# Patient Record
Sex: FEMALE | Race: WHITE | NOT HISPANIC OR LATINO | Employment: OTHER | URBAN - METROPOLITAN AREA
[De-identification: names, ages, dates, MRNs, and addresses within clinical notes are randomized per-mention and may not be internally consistent; named-entity substitution may affect disease eponyms.]

---

## 2023-11-02 ENCOUNTER — TELEPHONE (OUTPATIENT)
Dept: PRIMARY CARE CLINIC | Facility: CLINIC | Age: 53
End: 2023-11-02
Payer: COMMERCIAL

## 2023-11-02 NOTE — TELEPHONE ENCOUNTER
----- Message from Elizabeth Suarez MD sent at 11/2/2023 10:55 AM CDT -----  Contact: ROSAURA PUCKETT [[id number]]  I am going to need her records first, I will check her meditech      ----- Message -----  From: Irish Bourgeois MA  Sent: 11/2/2023  10:47 AM CDT  To: Elizabeth Suarez MD      ----- Message -----  From: Denise Orourke  Sent: 11/2/2023  10:36 AM CDT  To: [facility] Primary [facility] Clinical Support Staff    ..Type:  Patient Requesting Call    Who Called: ROSAURA PUCKETT [[id number]]  Does the patient know what this is regarding?: Scheduling NP appt, complex issues from covid 2 years ago and currently on oxygen  Would the patient rather a call back or a response via [facility]? call  Best Call Back Number: .[phone] (home)   Additional Information:

## 2024-05-31 DIAGNOSIS — J45.909 ASTHMA, UNSPECIFIED ASTHMA SEVERITY, UNSPECIFIED WHETHER COMPLICATED, UNSPECIFIED WHETHER PERSISTENT: Primary | ICD-10-CM

## 2025-04-17 DIAGNOSIS — G62.9 POLYNEUROPATHY: Primary | ICD-10-CM

## 2025-06-03 ENCOUNTER — OFFICE VISIT (OUTPATIENT)
Dept: NEUROLOGY | Facility: CLINIC | Age: 55
End: 2025-06-03
Payer: MEDICARE

## 2025-06-03 ENCOUNTER — LAB VISIT (OUTPATIENT)
Dept: LAB | Facility: OTHER | Age: 55
End: 2025-06-03
Attending: STUDENT IN AN ORGANIZED HEALTH CARE EDUCATION/TRAINING PROGRAM
Payer: MEDICARE

## 2025-06-03 ENCOUNTER — RESULTS FOLLOW-UP (OUTPATIENT)
Dept: NEUROLOGY | Facility: CLINIC | Age: 55
End: 2025-06-03

## 2025-06-03 VITALS
SYSTOLIC BLOOD PRESSURE: 111 MMHG | WEIGHT: 199.75 LBS | BODY MASS INDEX: 37.71 KG/M2 | HEIGHT: 61 IN | HEART RATE: 101 BPM | DIASTOLIC BLOOD PRESSURE: 77 MMHG

## 2025-06-03 DIAGNOSIS — Z77.010 ARSENIC SUSPECTED EXPOSURE: Primary | ICD-10-CM

## 2025-06-03 DIAGNOSIS — R20.8 VIBRATION SENSORY LOSS: ICD-10-CM

## 2025-06-03 DIAGNOSIS — M79.2 NEUROPATHIC PAIN: ICD-10-CM

## 2025-06-03 DIAGNOSIS — G60.9 HEREDITARY AND IDIOPATHIC NEUROPATHY, UNSPECIFIED: ICD-10-CM

## 2025-06-03 DIAGNOSIS — G62.9 POLYNEUROPATHY: Primary | ICD-10-CM

## 2025-06-03 PROBLEM — K21.9 GERD (GASTROESOPHAGEAL REFLUX DISEASE): Status: ACTIVE | Noted: 2025-06-03

## 2025-06-03 PROBLEM — J44.9 COPD (CHRONIC OBSTRUCTIVE PULMONARY DISEASE): Chronic | Status: ACTIVE | Noted: 2025-06-03

## 2025-06-03 PROBLEM — M06.9 RHEUMATOID ARTHRITIS: Chronic | Status: ACTIVE | Noted: 2025-06-03

## 2025-06-03 PROBLEM — Z99.81 ON HOME OXYGEN THERAPY: Chronic | Status: ACTIVE | Noted: 2025-06-03

## 2025-06-03 LAB
URATE SERPL-MCNC: 3.4 MG/DL (ref 2.4–5.7)
VIT B12 SERPL-MCNC: 1017 PG/ML (ref 210–950)

## 2025-06-03 PROCEDURE — 84550 ASSAY OF BLOOD/URIC ACID: CPT

## 2025-06-03 PROCEDURE — 84165 PROTEIN E-PHORESIS SERUM: CPT | Mod: ,,, | Performed by: PATHOLOGY

## 2025-06-03 PROCEDURE — 82525 ASSAY OF COPPER: CPT

## 2025-06-03 PROCEDURE — 36415 COLL VENOUS BLD VENIPUNCTURE: CPT

## 2025-06-03 PROCEDURE — 84165 PROTEIN E-PHORESIS SERUM: CPT

## 2025-06-03 PROCEDURE — 86334 IMMUNOFIX E-PHORESIS SERUM: CPT

## 2025-06-03 PROCEDURE — 86334 IMMUNOFIX E-PHORESIS SERUM: CPT | Mod: ,,, | Performed by: PATHOLOGY

## 2025-06-03 PROCEDURE — 82607 VITAMIN B-12: CPT

## 2025-06-03 PROCEDURE — 86038 ANTINUCLEAR ANTIBODIES: CPT

## 2025-06-03 PROCEDURE — 84630 ASSAY OF ZINC: CPT

## 2025-06-03 PROCEDURE — 99999 PR PBB SHADOW E&M-EST. PATIENT-LVL III: CPT | Mod: PBBFAC,,, | Performed by: STUDENT IN AN ORGANIZED HEALTH CARE EDUCATION/TRAINING PROGRAM

## 2025-06-03 PROCEDURE — 84207 ASSAY OF VITAMIN B-6: CPT

## 2025-06-03 PROCEDURE — 99213 OFFICE O/P EST LOW 20 MIN: CPT | Mod: PBBFAC | Performed by: STUDENT IN AN ORGANIZED HEALTH CARE EDUCATION/TRAINING PROGRAM

## 2025-06-03 RX ORDER — VORTIOXETINE 20 MG/1
1 TABLET, FILM COATED ORAL DAILY
COMMUNITY
Start: 2025-04-03

## 2025-06-03 RX ORDER — PROCHLORPERAZINE MALEATE 10 MG
10 TABLET ORAL 3 TIMES DAILY
COMMUNITY

## 2025-06-03 RX ORDER — ALBUTEROL SULFATE AND BUDESONIDE 90; 80 UG/1; UG/1
2 AEROSOL, METERED RESPIRATORY (INHALATION) 4 TIMES DAILY PRN
COMMUNITY
Start: 2025-05-23

## 2025-06-03 RX ORDER — CYCLOBENZAPRINE HCL 10 MG
10 TABLET ORAL 2 TIMES DAILY
COMMUNITY

## 2025-06-03 RX ORDER — ALENDRONATE SODIUM 70 MG/1
70 TABLET ORAL
COMMUNITY
Start: 2025-04-14

## 2025-06-03 RX ORDER — LOSARTAN POTASSIUM 25 MG/1
25 TABLET ORAL EVERY MORNING
COMMUNITY
Start: 2025-03-25

## 2025-06-03 RX ORDER — TIRZEPATIDE 7.5 MG/.5ML
7.5 INJECTION, SOLUTION SUBCUTANEOUS
COMMUNITY
Start: 2025-03-10

## 2025-06-03 RX ORDER — POTASSIUM CHLORIDE 20 MEQ/1
20 TABLET, EXTENDED RELEASE ORAL ONCE
COMMUNITY
Start: 2025-03-25

## 2025-06-03 RX ORDER — SULFASALAZINE 500 MG/1
1000 TABLET ORAL 2 TIMES DAILY
COMMUNITY
Start: 2025-05-24

## 2025-06-03 RX ORDER — LEFLUNOMIDE 20 MG/1
20 TABLET ORAL DAILY
COMMUNITY
Start: 2025-03-25

## 2025-06-03 RX ORDER — FOLIC ACID 0.4 MG
400 TABLET ORAL DAILY
COMMUNITY

## 2025-06-03 RX ORDER — METOPROLOL SUCCINATE 25 MG/1
25 TABLET, EXTENDED RELEASE ORAL DAILY
COMMUNITY

## 2025-06-03 RX ORDER — FUROSEMIDE 20 MG/1
40 TABLET ORAL DAILY
COMMUNITY

## 2025-06-03 RX ORDER — LEVOTHYROXINE SODIUM 75 UG/1
75 TABLET ORAL
COMMUNITY
Start: 2025-03-25

## 2025-06-03 RX ORDER — GABAPENTIN 300 MG/1
300 CAPSULE ORAL NIGHTLY
COMMUNITY
End: 2025-06-03

## 2025-06-03 RX ORDER — ERGOCALCIFEROL 1.25 MG/1
50000 CAPSULE ORAL
COMMUNITY

## 2025-06-03 RX ORDER — PREGABALIN 25 MG/1
CAPSULE ORAL
Qty: 114 CAPSULE | Refills: 0 | Status: SHIPPED | OUTPATIENT
Start: 2025-06-03 | End: 2025-07-03

## 2025-06-03 RX ORDER — GABAPENTIN 100 MG/1
100 CAPSULE ORAL DAILY PRN
COMMUNITY

## 2025-06-04 LAB
ALBUMIN, SPE (OHS): 4.91 G/DL (ref 3.35–5.55)
ALPHA 1 GLOB (OHS): 0.21 GM/DL (ref 0.17–0.41)
ALPHA 2 GLOB (OHS): 0.42 GM/DL (ref 0.43–0.99)
ANA (OHS): NORMAL
BETA GLOB (OHS): 0.6 GM/DL (ref 0.5–1.1)
GAMMA GLOBULIN (OHS): 0.75 GM/DL (ref 0.67–1.58)
PATHOLOGIST INTERPRETATION - IFE SERUM (OHS): NORMAL
PATHOLOGIST REVIEW - SPE (OHS): NORMAL
PROT SERPL-MCNC: 6.9 GM/DL (ref 6–8.4)

## 2025-06-05 ENCOUNTER — TELEPHONE (OUTPATIENT)
Dept: HEMATOLOGY/ONCOLOGY | Facility: CLINIC | Age: 55
End: 2025-06-05
Payer: MEDICARE

## 2025-06-05 LAB
ADDRESS: ABNORMAL
ARSENIC BLD-MCNC: 23 NG/ML
ATTENDING PHYSICIAN NAME: ABNORMAL
CADMIUM BLD-MCNC: <0.2 NG/ML
COUNTY OF RESIDENCE: ABNORMAL
EMPLOYER NAME: ABNORMAL
FACILITY PHONE #: ABNORMAL
HX OF OCCUPATION: ABNORMAL
LEAD BLDV-MCNC: <1 MCG/DL
M HEALTH CARE PROVIDER PHONE: ABNORMAL
M PATIENT CITY: ABNORMAL
MERCURY BLD-MCNC: 2 NG/ML
PHONE #: ABNORMAL
POSTAL CODE: ABNORMAL
PROVIDER CITY: ABNORMAL
PROVIDER POSTAL CODE: ABNORMAL
PROVIDER STATE: ABNORMAL
REFER PHYSICIAN ADDR: ABNORMAL
SPECIMEN SOURCE: ABNORMAL
STATE OF RESIDENCE: ABNORMAL

## 2025-06-06 ENCOUNTER — TELEPHONE (OUTPATIENT)
Dept: HEMATOLOGY/ONCOLOGY | Facility: CLINIC | Age: 55
End: 2025-06-06
Payer: MEDICARE

## 2025-06-06 DIAGNOSIS — R79.89 ABNORMAL CBC: ICD-10-CM

## 2025-06-06 DIAGNOSIS — D72.829 LEUKOCYTOSIS, UNSPECIFIED TYPE: Primary | ICD-10-CM

## 2025-06-06 LAB — W ZINC: 93 UG/DL

## 2025-06-09 LAB — W COPPER: 903 UG/L

## 2025-06-10 DIAGNOSIS — G62.9 POLYNEUROPATHY: Primary | ICD-10-CM

## 2025-06-11 ENCOUNTER — OFFICE VISIT (OUTPATIENT)
Dept: HEMATOLOGY/ONCOLOGY | Facility: CLINIC | Age: 55
End: 2025-06-11
Payer: MEDICARE

## 2025-06-11 VITALS
BODY MASS INDEX: 37.31 KG/M2 | OXYGEN SATURATION: 92 % | DIASTOLIC BLOOD PRESSURE: 86 MMHG | HEART RATE: 89 BPM | WEIGHT: 197.63 LBS | RESPIRATION RATE: 16 BRPM | HEIGHT: 61 IN | SYSTOLIC BLOOD PRESSURE: 128 MMHG

## 2025-06-11 DIAGNOSIS — D72.829 LEUKOCYTOSIS, UNSPECIFIED TYPE: ICD-10-CM

## 2025-06-11 NOTE — PROGRESS NOTES
HEMATOLOGY INITIAL CONSULTATION NOTE    Patient ID: Yesenia Remy is a 54 y.o. female.    Chief Complaint:  Leukocytosis    HPI:  Patient is a 54-year-old female with past medical history of rheumatoid arthritis, allergies, anemia, anxiety,  congestive heart failure, COPD, depression, diabetes mellitus, emphysema of lung, gastroesophageal reflux disease who was referred by her PCP for evaluation of leukocytosis noted on her labs.  Patient voices no acute complaints and presents to our clinic today for further evaluation..  Denies any lumps/bumps, activity and appetite changes.                  Past Medical History:   Diagnosis Date    Allergy     Anemia     Anxiety     Arthritis     Asthma     CHF (congestive heart failure)     COPD (chronic obstructive pulmonary disease)     Depression     Diabetes mellitus     Disorder of kidney and ureter     Emphysema of lung     Encounter for blood transfusion     GERD (gastroesophageal reflux disease)     Hypertension     Osteopenia     Thyroid disease        Family History   Problem Relation Name Age of Onset    Kidney cancer Mother      Prostate cancer Father      Hypertension Father      Hyperlipidemia Father      Kidney cancer Maternal Aunt      Pancreatic cancer Maternal Aunt      Breast cancer Maternal Aunt      Lung cancer Maternal Uncle      Colon cancer Maternal Uncle      Cancer Maternal Grandmother      Leukemia Paternal Grandmother      Skin cancer Paternal Grandfather      Leukemia Cousin         Social History[1]      Past Surgical History:   Procedure Laterality Date    BACK SURGERY      x2    CHOLECYSTECTOMY      HYSTERECTOMY                         Review of systems:  Review of Systems   Constitutional:  Negative for activity change, appetite change, chills, diaphoresis, fatigue and unexpected weight change.   HENT:  Negative for congestion, facial swelling, hearing loss, mouth sores, trouble swallowing and voice change.    Eyes:  Negative for photophobia,  pain, discharge and itching.   Respiratory:  Negative for apnea, cough, choking, chest tightness and shortness of breath.    Cardiovascular:  Negative for chest pain, palpitations and leg swelling.   Gastrointestinal:  Negative for abdominal distention, abdominal pain, anal bleeding and blood in stool.   Endocrine: Negative for cold intolerance, heat intolerance, polydipsia and polyphagia.   Genitourinary:  Negative for difficulty urinating, dysuria, flank pain and hematuria.   Musculoskeletal:  Negative for arthralgias, back pain, joint swelling, myalgias, neck pain and neck stiffness.   Skin:  Negative for color change, pallor and wound.   Allergic/Immunologic: Negative for environmental allergies, food allergies and immunocompromised state.   Neurological:  Negative for dizziness, seizures, facial asymmetry, speech difficulty, light-headedness, numbness and headaches.   Hematological:  Negative for adenopathy. Does not bruise/bleed easily.   Psychiatric/Behavioral:  Negative for agitation, behavioral problems, confusion, decreased concentration and sleep disturbance.                      Physical Exam  Vitals and nursing note reviewed.   Constitutional:       General: She is not in acute distress.     Appearance: Normal appearance. She is not ill-appearing.   HENT:      Head: Normocephalic and atraumatic.      Nose: No congestion or rhinorrhea.   Eyes:      General: No scleral icterus.     Extraocular Movements: Extraocular movements intact.      Pupils: Pupils are equal, round, and reactive to light.   Cardiovascular:      Rate and Rhythm: Normal rate and regular rhythm.      Pulses: Normal pulses.      Heart sounds: Normal heart sounds. No murmur heard.     No gallop.   Pulmonary:      Effort: Pulmonary effort is normal. No respiratory distress.      Breath sounds: Normal breath sounds. No stridor. No wheezing or rhonchi.   Abdominal:      General: Bowel sounds are normal. There is no distension.       Palpations: There is no mass.      Tenderness: There is no abdominal tenderness. There is no guarding.   Musculoskeletal:         General: No swelling, tenderness, deformity or signs of injury. Normal range of motion.      Cervical back: Normal range of motion and neck supple. No rigidity. No muscular tenderness.      Right lower leg: No edema.      Left lower leg: No edema.   Skin:     General: Skin is warm.      Coloration: Skin is not jaundiced or pale.      Findings: No bruising or lesion.   Neurological:      General: No focal deficit present.      Mental Status: She is alert and oriented to person, place, and time.      Cranial Nerves: No cranial nerve deficit.      Sensory: No sensory deficit.      Motor: No weakness.      Gait: Gait normal.   Psychiatric:         Mood and Affect: Mood normal.         Behavior: Behavior normal.         Thought Content: Thought content normal.                                           Vitals:    06/11/25 1316   BP: 128/86   Pulse: 89   Resp: 16   Body surface area is 1.96 meters squared.    Assessment/Plan:      Leukocytosis:    == reviewed recent labs done with her primary care provider where she is noted to have leukocytosis with normal hemoglobin and hematocrit.  Elevated MCV at 100 and normal platelet count.  Mild elevation in mean platelet volume but otherwise no significant abnormalities noted on her blood work.  I will obtain peripheral smear and flow cytometry along with checking for BCR-ABL gene rearrangement to rule out any underlying bone marrow problem.  If no abnormalities are noted on the lab work above then will continue with observation.  Discussed with patient possible reactive causes of leukocytosis especially in her case giving her opportunity to ask questions.  She voiced understanding        Plan:  Peripheral smear  Flow cytometry  BCR-ABL gene rearrangement        Return to clinic in 5 weeks for MD visit for follow-up with CBC with diff prior          Pt  is instructed to RTC with labs for continued monitoring of treatment as instructed.     Total time spent in counseling and discussion about further management options including relevant lab work, treatment,  prognosis, medications and intended side effects was more than 60 minutes. More than 50 % of the time was spent in counseling and coordination of care.  I spent a total of 60 minutes on the day of the visit.This includes face to face time and non-face to face time preparing to see the patient (eg, review of tests), Obtaining and/or reviewing separately obtained history, Documenting clinical information in the electronic or other health record, Independently interpreting resultsand communicating results to the patient/family/caregiver, or Care coordination.        [1]   Social History  Socioeconomic History    Marital status:    Tobacco Use    Smoking status: Never    Smokeless tobacco: Never   Substance and Sexual Activity    Alcohol use: Not Currently    Drug use: Never    Sexual activity: Not Currently     Social Drivers of Health     Financial Resource Strain: Medium Risk (6/11/2025)    Overall Financial Resource Strain (CARDIA)     Difficulty of Paying Living Expenses: Somewhat hard   Food Insecurity: Food Insecurity Present (6/11/2025)    Hunger Vital Sign     Worried About Running Out of Food in the Last Year: Sometimes true     Ran Out of Food in the Last Year: Never true   Transportation Needs: No Transportation Needs (6/11/2025)    PRAPARE - Transportation     Lack of Transportation (Medical): No     Lack of Transportation (Non-Medical): No   Physical Activity: Insufficiently Active (6/11/2025)    Exercise Vital Sign     Days of Exercise per Week: 2 days     Minutes of Exercise per Session: 10 min   Stress: Stress Concern Present (6/11/2025)    [employer] - Occupational Stress Questionnaire     Feeling of Stress : Rather much   Housing Stability: Low Risk   (6/11/2025)    Housing Stability Vital Sign     Unable to Pay for Housing in the Last Year: No     Number of Times Moved in the Last Year: 0     Homeless in the Last Year: No

## 2025-06-26 ENCOUNTER — PATIENT MESSAGE (OUTPATIENT)
Dept: NEUROLOGY | Facility: CLINIC | Age: 55
End: 2025-06-26
Payer: MEDICARE

## 2025-06-30 ENCOUNTER — PATIENT MESSAGE (OUTPATIENT)
Dept: NEUROLOGY | Facility: CLINIC | Age: 55
End: 2025-06-30

## 2025-07-01 ENCOUNTER — PATIENT MESSAGE (OUTPATIENT)
Dept: NEUROLOGY | Facility: CLINIC | Age: 55
End: 2025-07-01
Payer: MEDICARE

## 2025-07-03 ENCOUNTER — OFFICE VISIT (OUTPATIENT)
Dept: NEUROLOGY | Facility: CLINIC | Age: 55
End: 2025-07-03
Payer: MEDICARE

## 2025-07-03 DIAGNOSIS — M54.9 DORSALGIA, UNSPECIFIED: Primary | ICD-10-CM

## 2025-07-03 DIAGNOSIS — G62.9 POLYNEUROPATHY: ICD-10-CM

## 2025-07-03 DIAGNOSIS — M79.2 NEUROPATHIC PAIN: ICD-10-CM

## 2025-07-03 RX ORDER — PREGABALIN 25 MG/1
CAPSULE ORAL
Qty: 150 CAPSULE | Refills: 5 | Status: SHIPPED | OUTPATIENT
Start: 2025-07-03

## 2025-07-03 NOTE — Clinical Note
Please assist in scheduling her MRI, I looked at her discs and it did not include her old lumbar MRI unfortunately, please let her know, thanks

## 2025-07-03 NOTE — PROGRESS NOTES
Patient ID: [id number]  The patient location is: [facility]   The chief complaint leading to consultation is: f/u on neuropathy     Visit type: audiovisual    Face to Face time with patient: 17    Each patient to whom he or she provides medical services by telemedicine is:  (1) informed of the relationship between the physician and patient and the respective role of any other health care provider with respect to management of the patient; and (2) notified that he or she may decline to receive medical services by telemedicine and may withdraw from such care at any time.    Notes:     Subjective:     HPI  Yesenia Remy is a 55 y.o. RH female who reportedly has RA, GERD, and COPD among other co morbidities. she is presenting today for evaluation of neuropathy.     Interval history (07/03/2025):  Still has burning sensation and hypersensitivity in lateral and medial thighs.  Lyrica is helping more than gabapentin did, makes her sleepy.  No B/B disturbance. Reports continued back pain.    Initial HPI (6/3/2025):  Reports tingling, numbness, and shooting pain in her feet up to ankles, worse at nights.  She has sensitivity to touch on her feet.  She has similar symptoms on the dorsum of the right hand, radiating to the elbow.  Symptoms started 2 years ago and progressively getting worse.  She walks with a walker due to occasional imbalance and mainly TAVERAS that causes her to take breaks when walking.  A previous EMG on upper extremity had shown peripheral neuropathy consistent with DM.    Relevant history:   Diabetes Mellitus: (+), most recent A1C was reportedly in 5s   Thyroid dysfunction: (+)  Bariatric surgery: (--)  Vegan/vegetarian: (--)  Celiac/Crohn's/UC:(--)  Substance use (tuyet. Nitrous oxide): (--), drinks 0 alcoholic drinks   Cervical/lumbar spondylosis: (+), cervical and lumbar  History of chemotherapy: (--)  List of tried relevant medications/procedures: flexeril 10 mg, gabapentin 100 qAM, 300 mg qPM, 300 mg  qHS. LAQUITA in cervical and lumbar (effective). Injections in the right foot (ineffective)     Review of Systems   Constitutional:  Negative for unexpected weight change.   Musculoskeletal:  Positive for arthralgias, back pain, gait problem, leg pain and neck pain.   Neurological:  Positive for numbness. Negative for weakness.   Psychiatric/Behavioral:  Negative for agitation and confusion.    All other systems reviewed and are negative.    Past Medical History:  -------------------------------------    Allergy    Anemia    Anxiety    Arthritis    Asthma    CHF (congestive heart failure)    COPD (chronic obstructive pulmonary disease)    Depression    Diabetes mellitus    Disorder of kidney and ureter    Emphysema of lung    Encounter for blood transfusion    GERD (gastroesophageal reflux disease)    Hypertension    Osteopenia    Thyroid disease       Allergies:  Review of patient's allergies indicates:   Allergen Reactions    Clonidine Itching    Lisinopril Itching    Opioids - morphine analogues Itching       Pertinent Family History:  Family History   Problem Relation Name Age of Onset    Kidney cancer Mother      Prostate cancer Father      Hypertension Father      Hyperlipidemia Father      Kidney cancer Maternal Aunt      Pancreatic cancer Maternal Aunt      Breast cancer Maternal Aunt      Lung cancer Maternal Uncle      Colon cancer Maternal Uncle      Cancer Maternal Grandmother      Leukemia Paternal Grandmother      Skin cancer Paternal Grandfather      Leukemia Cousin         Pertinent Social History:  Social History[1]    Medications:  Current Outpatient Medications   Medication Instructions    AIRSUPRA 90-80 mcg/actuation 2 puffs, 4 times daily PRN    alendronate (FOSAMAX) 70 mg, Every 7 days    cyclobenzaprine (FLEXERIL) 10 mg, 2 times daily    ergocalciferol (ERGOCALCIFEROL) 50,000 Units, Every 7 days    folic acid (FOLVITE) 400 mcg, Daily    furosemide (LASIX) 40 mg, Daily    gabapentin (NEURONTIN) 100  mg, Daily PRN    leflunomide (ARAVA) 20 mg, Daily    levothyroxine (SYNTHROID) 75 mcg, Before breakfast    losartan (COZAAR) 25 mg, Every morning    metoprolol succinate (TOPROL-XL) 25 mg, Daily    MOUNJARO 7.5 mg, Every 7 days    potassium chloride SA (K-DUR,KLOR-CON) 20 MEQ tablet 20 mEq, Once    pregabalin (LYRICA) 25 MG capsule Take 1 capsule (25 mg total) by mouth 2 (two) times daily for 3 days, THEN 2 capsules (50 mg total) 2 (two) times daily for 27 days.    prochlorperazine (COMPAZINE) 10 mg, 3 times daily    sulfaSALAzine (AZULFIDINE) 1,000 mg, 2 times daily    TRINTELLIX 20 mg Tab 1 tablet, Daily        Objective:     *exam is limited due to the virtual nature of this visit    General:  Well-appearing, well-nourished, NAD, cooperative    Neurologic Exam:   Awake, alert and oriented x3  Speech spontaneous and fluent, intact comprehension.   Adequate fund of knowledge, vocabulary.  EOM intact. No ophthalmoplegia.   Facial expression is full and symmetric.   Hearing is intact.  Antigravity in BUE. No tremor.      Pertinent lab results  Copper 903  Zinc 93  SPEP/MOUNIKA nl  B6 29    Lab Results   Component Value Date    ODUNSZQD14 1,017 (H) 06/03/2025     Lab Results   Component Value Date    ARSENB 23 (H) 06/03/2025    LEADB <1.0 06/03/2025    CADMB <0.2 06/03/2025    MERCB 2 06/03/2025     Lab Results   Component Value Date    ANTON Negative <1:80 06/03/2025    LABURIC 3.4 06/03/2025     *outside labs reviewed:  4/2025  CBC: WBC 13.2K (PMN 66.5%), Hgb 11.7, Plt 173K  CRP <3  CMP: nl except Co2 30  Anti dsDAN -  Anti CCP -    Pertinent imaging results    *Images personally reviewed and interpreted:  2/27/2025  MRI Cervical Spine wo contrast:  Multilevel degenerative changes with subsequent canal and foraminal stenosis (L>>R)  Reversal of cervical lordosis       *No images available to review in person for these studies, per radiology report:  5/8/2025  MRI of right foot wo contrast:  No evidence of intra metatarsal  neuroma or stress fracture  1st-4th MTP joint effusions    7/25/2024  MRI Lumbar Spine wo contrast:  1.  Prominent right-sided synovial cyst at L2-3 resulting in mild spinal stenosis and right lateral r   ecess stenosis with nerve root contact.   2.  Moderate lumbar DJD. Multilevel foraminal narrowing with possible spanning L4-5, as described.     Other pertinent studies    3/20/2024  EMG-NCS:  Mild generalized predominantly sensory polyneuropathy affecting both upper limbs.  No evidence of entrapment neuropathies.    Assessment:   Yesenia Remy is a 55 y.o. RH female with medical comorbidities mentioned above including diabetes who presents for follow up on neuropathic pain. She has had a better response to Lyrica however it wears off in the middle of the day. We will add a mid day dose. Serum work up was overall normal except elevated arsenic. We will repeat the test to confirm. We will also repeat MRI of lumbar spine to assess for progression of lumbar spondylosis. I personally reviewed the outside MRI of cervical spine but did not receive the lumbar MRI from last year.      1. Dorsalgia, unspecified    2. Polyneuropathy    3. Neuropathic pain      Plan:     - pregabalin (LYRICA) 25 MG capsule; Take 2 capsules (50 mg) in the morning, 1 capsule (25 mg) in the afternoon, and 2 capsules (50 mg) before bedtime  Dispense: 150 capsule; Refill: 5  - MRI Lumbar Spine W WO Cont; Future        Plan was discussed in detail with the patient, who is in agreement.      Disclaimer: This note was partly generated using dictation software which may occasionally result in transcription errors that are missed on review.      Based on our encounter today, my overall Medical Decision Making is a Level 4     Complexity of Problem: Moderate (1 or more chronic illnesses with exacerbation, progression or treatment side effects)  Complexity of Data: Extensive (Review of >3 unique test results, Ordering each unique test, and Independent  interpretation of tests)  Risk of Complications and/or morbidity/mortality of Management: Moderate risk (Prescription drug management)          Lauren Orellana MD    [facility]  07/03/2025         [1]   Social History  Tobacco Use    Smoking status: Never    Smokeless tobacco: Never   Substance Use Topics    Alcohol use: Not Currently    Drug use: Never

## 2025-07-07 DIAGNOSIS — G62.9 POLYNEUROPATHY: Primary | ICD-10-CM

## 2025-07-16 ENCOUNTER — OFFICE VISIT (OUTPATIENT)
Dept: HEMATOLOGY/ONCOLOGY | Facility: CLINIC | Age: 55
End: 2025-07-16
Payer: MEDICARE

## 2025-07-16 ENCOUNTER — PATIENT MESSAGE (OUTPATIENT)
Dept: NEUROLOGY | Facility: CLINIC | Age: 55
End: 2025-07-16
Payer: MEDICARE

## 2025-07-16 VITALS
DIASTOLIC BLOOD PRESSURE: 82 MMHG | SYSTOLIC BLOOD PRESSURE: 120 MMHG | HEART RATE: 83 BPM | HEIGHT: 61 IN | OXYGEN SATURATION: 91 % | RESPIRATION RATE: 16 BRPM | BODY MASS INDEX: 37.48 KG/M2 | WEIGHT: 198.5 LBS

## 2025-07-16 DIAGNOSIS — D72.829 LEUKOCYTOSIS, UNSPECIFIED TYPE: Primary | ICD-10-CM

## 2025-07-16 PROCEDURE — G2211 COMPLEX E/M VISIT ADD ON: HCPCS | Mod: ,,, | Performed by: INTERNAL MEDICINE

## 2025-07-16 PROCEDURE — 99215 OFFICE O/P EST HI 40 MIN: CPT | Mod: S$PBB,,, | Performed by: INTERNAL MEDICINE

## 2025-07-16 NOTE — PROGRESS NOTES
HEMATOLOGY FOLLOW UP CONSULTATION NOTE    Patient ID: Yesenia Remy is a 55 y.o. female.    Chief Complaint:  Leukocytosis    HPI:  Patient is a 54-year-old female with past medical history of rheumatoid arthritis, allergies, anemia, anxiety,  congestive heart failure, COPD, depression, diabetes mellitus, emphysema of lung, gastroesophageal reflux disease who was referred by her PCP for evaluation of leukocytosis noted on her labs.  Patient voices no acute complaints and presents to our clinic today for further evaluation..  Denies any lumps/bumps, activity and appetite changes.                  Past Medical History:   Diagnosis Date    Allergy     Anemia     Anxiety     Arthritis     Asthma     CHF (congestive heart failure)     COPD (chronic obstructive pulmonary disease)     Depression     Diabetes mellitus     Disorder of kidney and ureter     Emphysema of lung     Encounter for blood transfusion     GERD (gastroesophageal reflux disease)     Hypertension     Osteopenia     Thyroid disease        Family History   Problem Relation Name Age of Onset    Kidney cancer Mother      Prostate cancer Father      Hypertension Father      Hyperlipidemia Father      Kidney cancer Maternal Aunt      Pancreatic cancer Maternal Aunt      Breast cancer Maternal Aunt      Lung cancer Maternal Uncle      Colon cancer Maternal Uncle      Cancer Maternal Grandmother      Leukemia Paternal Grandmother      Skin cancer Paternal Grandfather      Leukemia Cousin         Social History[1]      Past Surgical History:   Procedure Laterality Date    BACK SURGERY      x2    CHOLECYSTECTOMY      HYSTERECTOMY                         Review of systems:  Review of Systems   Constitutional:  Negative for activity change, appetite change, chills, diaphoresis, fatigue and unexpected weight change.   HENT:  Negative for congestion, facial swelling, hearing loss, mouth sores, trouble swallowing and voice change.    Eyes:  Negative for photophobia,  pain, discharge and itching.   Respiratory:  Negative for apnea, cough, choking, chest tightness and shortness of breath.    Cardiovascular:  Negative for chest pain, palpitations and leg swelling.   Gastrointestinal:  Negative for abdominal distention, abdominal pain, anal bleeding and blood in stool.   Endocrine: Negative for cold intolerance, heat intolerance, polydipsia and polyphagia.   Genitourinary:  Negative for difficulty urinating, dysuria, flank pain and hematuria.   Musculoskeletal:  Negative for arthralgias, back pain, joint swelling, myalgias, neck pain and neck stiffness.   Skin:  Negative for color change, pallor and wound.   Allergic/Immunologic: Negative for environmental allergies, food allergies and immunocompromised state.   Neurological:  Negative for dizziness, seizures, facial asymmetry, speech difficulty, light-headedness, numbness and headaches.   Hematological:  Negative for adenopathy. Does not bruise/bleed easily.   Psychiatric/Behavioral:  Negative for agitation, behavioral problems, confusion, decreased concentration and sleep disturbance.                      Physical Exam  Vitals and nursing note reviewed.   Constitutional:       General: She is not in acute distress.     Appearance: Normal appearance. She is not ill-appearing.   HENT:      Head: Normocephalic and atraumatic.      Nose: No congestion or rhinorrhea.   Eyes:      General: No scleral icterus.     Extraocular Movements: Extraocular movements intact.      Pupils: Pupils are equal, round, and reactive to light.   Cardiovascular:      Rate and Rhythm: Normal rate and regular rhythm.      Pulses: Normal pulses.      Heart sounds: Normal heart sounds. No murmur heard.     No gallop.   Pulmonary:      Effort: Pulmonary effort is normal. No respiratory distress.      Breath sounds: Normal breath sounds. No stridor. No wheezing or rhonchi.   Abdominal:      General: Bowel sounds are normal. There is no distension.       Palpations: There is no mass.      Tenderness: There is no abdominal tenderness. There is no guarding.   Musculoskeletal:         General: No swelling, tenderness, deformity or signs of injury. Normal range of motion.      Cervical back: Normal range of motion and neck supple. No rigidity. No muscular tenderness.      Right lower leg: No edema.      Left lower leg: No edema.   Skin:     General: Skin is warm.      Coloration: Skin is not jaundiced or pale.      Findings: No bruising or lesion.   Neurological:      General: No focal deficit present.      Mental Status: She is alert and oriented to person, place, and time.      Cranial Nerves: No cranial nerve deficit.      Sensory: No sensory deficit.      Motor: No weakness.      Gait: Gait normal.   Psychiatric:         Mood and Affect: Mood normal.         Behavior: Behavior normal.         Thought Content: Thought content normal.                                           Vitals:    07/16/25 1038   BP: 120/82   Pulse: 83   Resp: 16   Body surface area is 1.97 meters squared.    Assessment/Plan:      Leukocytosis:    == reviewed recent labs done with her primary care provider where she is noted to have leukocytosis with normal hemoglobin and hematocrit.  Elevated MCV at 100 and normal platelet count.  Mild elevation in mean platelet volume but otherwise no significant abnormalities noted on her blood work.  I will obtain peripheral smear and flow cytometry along with checking for BCR-ABL gene rearrangement to rule out any underlying bone marrow problem.  If no abnormalities are noted on the lab work above then will continue with observation.  Discussed with patient possible reactive causes of leukocytosis especially in her case giving her opportunity to ask questions.  She voiced understanding.  == 7/16/25:  Normocytic, hypochromic red cell population.  Normal white blood cell number with relative/absolute neutrophilia.  No evidence of dysplastic neutrophils,  monocytosis, lymphocytosis or definitive blasts were seen. Normal platelet count and morphology.  No diagnostic immunophenotypic abnormalities detected via flow cytometry.  BCR-ABL 1 translocation not detected.  Leukocytosis is likely reactive instead of a primary bone marrow problem.  Will continue with observation only.  No indication for bone marrow biopsy.        Plan:  Continue observation        Return to clinic in 3 months for MD visit for follow-up with CBC with diff prior          Pt is instructed to RTC with labs for continued monitoring of treatment as instructed.     Total time spent in counseling and discussion about further management options including relevant lab work, treatment,  prognosis, medications and intended side effects was more than 40 minutes. More than 50 % of the time was spent in counseling and coordination of care.  I spent a total of 40 minutes on the day of the visit.This includes face to face time and non-face to face time preparing to see the patient (eg, review of tests), Obtaining and/or reviewing separately obtained history, Documenting clinical information in the electronic or other health record, Independently interpreting resultsand communicating results to the patient/family/caregiver, or Care coordination.          [1]   Social History  Socioeconomic History    Marital status:    Tobacco Use    Smoking status: Never    Smokeless tobacco: Never   Substance and Sexual Activity    Alcohol use: Not Currently    Drug use: Never    Sexual activity: Not Currently     Social Drivers of Health     Financial Resource Strain: Medium Risk (6/11/2025)    Overall Financial Resource Strain (CARDIA)     Difficulty of Paying Living Expenses: Somewhat hard   Food Insecurity: Food Insecurity Present (6/11/2025)    Hunger Vital Sign     Worried About Running Out of Food in the Last Year: Sometimes true     Ran Out of Food in the Last Year: Never true   Transportation Needs: No  Transportation Needs (6/11/2025)    PRAPARE - Transportation     Lack of Transportation (Medical): No     Lack of Transportation (Non-Medical): No   Physical Activity: Insufficiently Active (6/11/2025)    Exercise Vital Sign     Days of Exercise per Week: 2 days     Minutes of Exercise per Session: 10 min   Stress: Stress Concern Present (6/11/2025)    [employer] [place] [employer] - Occupational Stress Questionnaire     Feeling of Stress : Rather much   Housing Stability: Low Risk  (6/11/2025)    Housing Stability Vital Sign     Unable to Pay for Housing in the Last Year: No     Number of Times Moved in the Last Year: 0     Homeless in the Last Year: No

## 2025-07-16 NOTE — TELEPHONE ENCOUNTER
Staff contacted patient and informed her that PER Dr. Orellana to hold off on repeat lab and will follow-up at next scheduled office visit.

## 2025-07-25 ENCOUNTER — APPOINTMENT (OUTPATIENT)
Dept: RADIOLOGY | Facility: HOSPITAL | Age: 55
End: 2025-07-25
Attending: STUDENT IN AN ORGANIZED HEALTH CARE EDUCATION/TRAINING PROGRAM
Payer: MEDICARE

## 2025-07-25 DIAGNOSIS — M79.2 NEUROPATHIC PAIN: ICD-10-CM

## 2025-07-25 DIAGNOSIS — M54.9 DORSALGIA, UNSPECIFIED: ICD-10-CM

## 2025-07-25 PROCEDURE — A9577 INJ MULTIHANCE: HCPCS | Performed by: STUDENT IN AN ORGANIZED HEALTH CARE EDUCATION/TRAINING PROGRAM

## 2025-07-25 PROCEDURE — 25500020 PHARM REV CODE 255: Performed by: STUDENT IN AN ORGANIZED HEALTH CARE EDUCATION/TRAINING PROGRAM

## 2025-07-25 PROCEDURE — 72158 MRI LUMBAR SPINE W/O & W/DYE: CPT | Mod: TC

## 2025-07-25 RX ADMIN — GADOBENATE DIMEGLUMINE 20 ML: 529 INJECTION, SOLUTION INTRAVENOUS at 01:07
